# Patient Record
(demographics unavailable — no encounter records)

---

## 2025-05-14 NOTE — REVIEW OF SYSTEMS
[Night Sweats] : night sweats [Heartburn] : heartburn [Arthralgias] : arthralgias [Feeling Weak] : feeling weak [Negative] : Heme/Lymph

## 2025-05-14 NOTE — HISTORY OF PRESENT ILLNESS
[Patient reported mammogram was normal] : Patient reported mammogram was normal [Patient reported PAP Smear was normal] : Patient reported PAP Smear was normal [Regular Cycle Intervals] : periods have been regular [Frequency: Q ___ days] : menstrual periods occur approximately every [unfilled] days [Menarche Age: ____] : age at menarche was [unfilled] [Currently Active] : currently active [Mammogramdate] : 07/22 [PapSmeardate] : 2020 [TextBox_31] : always normal  [FreeTextEntry1] : 04/24/25

## 2025-05-14 NOTE — RESULTS/DATA
[TextEntry] : Urine dip negative   Labs received after visit: Hematology seen [facility] Dr. Nuno Kelly  Labs only included no note -  Notable for hgb electrophoresis with "Hgb E" trait? Hgb 10.5, MCV 68 Ferritin 17 Iron and iron sat low    Pelvic u/s 4/12/2024  Uterus 11 cm  3 x 3 cm IM fibroid in anterior fundal body, 3 x 2 x 2 cm IMf ibroid in anterior fundal portion, EMS limited 0.7 cm, b/l ovaries normal, no free fluid